# Patient Record
Sex: FEMALE | Race: WHITE | URBAN - METROPOLITAN AREA
[De-identification: names, ages, dates, MRNs, and addresses within clinical notes are randomized per-mention and may not be internally consistent; named-entity substitution may affect disease eponyms.]

---

## 2019-04-15 ENCOUNTER — APPOINTMENT (RX ONLY)
Dept: URBAN - METROPOLITAN AREA CLINIC 176 | Facility: CLINIC | Age: 29
Setting detail: DERMATOLOGY
End: 2019-04-15

## 2019-04-15 VITALS — HEIGHT: 65 IN | WEIGHT: 140 LBS

## 2019-04-15 DIAGNOSIS — L70.0 ACNE VULGARIS: ICD-10-CM | Status: INADEQUATELY CONTROLLED

## 2019-04-15 DIAGNOSIS — L60.9 NAIL DISORDER, UNSPECIFIED: ICD-10-CM

## 2019-04-15 PROCEDURE — 99213 OFFICE O/P EST LOW 20 MIN: CPT

## 2019-04-15 PROCEDURE — ? PRESCRIPTION SAMPLES PROVIDED

## 2019-04-15 PROCEDURE — ? OBSERVATION

## 2019-04-15 PROCEDURE — ? PRESCRIPTION

## 2019-04-15 PROCEDURE — ? ADDITIONAL NOTES

## 2019-04-15 PROCEDURE — ? TREATMENT REGIMEN

## 2019-04-15 RX ORDER — AZELAIC ACID 0.15 G/G
AEROSOL, FOAM TOPICAL BID
Qty: 2 | Refills: 11 | Status: ERX | COMMUNITY
Start: 2019-04-15

## 2019-04-15 RX ADMIN — AZELAIC ACID: 0.15 AEROSOL, FOAM TOPICAL at 16:55

## 2019-04-15 ASSESSMENT — LOCATION DETAILED DESCRIPTION DERM
LOCATION DETAILED: RIGHT LATERAL SUPERIOR CHEST
LOCATION DETAILED: LEFT SUPERIOR UPPER BACK
LOCATION DETAILED: RIGHT SUPERIOR ANTERIOR NECK
LOCATION DETAILED: LEFT SUPERIOR ANTERIOR NECK
LOCATION DETAILED: LEFT LATERAL SUPERIOR CHEST
LOCATION DETAILED: RIGHT SUPERIOR LATERAL UPPER BACK
LOCATION DETAILED: LEFT INDEX FINGERNAIL

## 2019-04-15 ASSESSMENT — LOCATION SIMPLE DESCRIPTION DERM
LOCATION SIMPLE: CHEST
LOCATION SIMPLE: LEFT ANTERIOR NECK
LOCATION SIMPLE: RIGHT ANTERIOR NECK
LOCATION SIMPLE: RIGHT BACK
LOCATION SIMPLE: LEFT INDEX FINGERNAIL
LOCATION SIMPLE: LEFT UPPER BACK

## 2019-04-15 ASSESSMENT — LOCATION ZONE DERM
LOCATION ZONE: NECK
LOCATION ZONE: TRUNK
LOCATION ZONE: FINGERNAIL

## 2019-04-15 NOTE — PROCEDURE: ADDITIONAL NOTES
Additional Notes: Patient trying to get pregnant. Shortly after she stopped OCPs the acne on her trunk worsened\\nIf patient becomes pregnant d/c all meds and confirm with OB what patient can use when pregnant
Detail Level: Simple

## 2019-04-15 NOTE — HPI: PIMPLES (ACNE)
Is This A New Presentation, Or A Follow-Up?: Follow Up Acne
Additional Comments (Use Complete Sentences): Patient has discontinued birth control and believes this is why her acne is worsened.
Females Only: When Was Your Last Menstrual Period?: 03/22/19